# Patient Record
Sex: FEMALE | Race: WHITE | URBAN - METROPOLITAN AREA
[De-identification: names, ages, dates, MRNs, and addresses within clinical notes are randomized per-mention and may not be internally consistent; named-entity substitution may affect disease eponyms.]

---

## 2017-04-20 DIAGNOSIS — Z76.0 ENCOUNTER FOR MEDICATION REFILL: ICD-10-CM

## 2017-04-21 RX ORDER — HYDROCORTISONE AND ACETIC ACID 20.75; 10.375 MG/ML; MG/ML
SOLUTION AURICULAR (OTIC)
Qty: 10 ML | Refills: 0 | Status: SHIPPED | OUTPATIENT
Start: 2017-04-21 | End: 2018-01-08

## 2017-04-21 NOTE — TELEPHONE ENCOUNTER
Pending Prescriptions:                       Disp   Refills    acetic acid-hydrocortisone (VOSOL-HC) simone*10 mL  0            Sig: INSTILL 2 DROPS INTO THE RIGHT EAR TWICE DAILY    Last OV 9/20/16  TSH, CMP, CBC 9/20/16  BMP, Lipid 11/19/14

## 2017-05-11 ENCOUNTER — DOCUMENTATION ONLY (OUTPATIENT)
Dept: SLEEP MEDICINE | Facility: CLINIC | Age: 52
End: 2017-05-11

## 2017-05-11 NOTE — PROGRESS NOTES
6 Month [facility] visit    Message left for patient to return call    Objective measures: 14 day rolling measures     Device settings:    EPAP Min Auto CPAP: 9 (CPAP Min Auto CPAP/ASV)    EPAP Max Auto CPAP: 15 (CPAP Max Auto CPAP/ASV)    Avg  pressure (90th %ile) 14 day average (Cata): 11.9cm H20    Objective measures: 14 day rolling measures      Compliance   (Goal >70%)  % compliance greater than four hours rolling average 14 days: [id number] %      Leak   (Goal < 10%)  Average % of night in large leak Rolling Average 14 days (CATA): 0/100      AHI  (Goal < 5)  AHI Rolling Average 14 Day: 1.64      Usage  (Goal >240)  Time mask on face 14 day average: 415 min    Assessment:Pt meeting objective benchmarks. Patient meeting subjective benchmarks.  Action plan: Waiting for patient to return call.  pt to follow up per provider request (1-2 yrs)

## 2017-08-21 ENCOUNTER — HOSPITAL ENCOUNTER (OUTPATIENT)
Dept: MAMMOGRAPHY | Facility: CLINIC | Age: 52
Discharge: HOME | End: 2017-08-21
Attending: FAMILY MEDICINE | Admitting: FAMILY MEDICINE
Payer: COMMERCIAL

## 2017-08-21 DIAGNOSIS — Z12.31 VISIT FOR SCREENING MAMMOGRAM: ICD-10-CM

## 2017-08-21 PROCEDURE — G0202 SCR MAMMO BI INCL CAD: HCPCS

## 2017-09-11 ENCOUNTER — OFFICE VISIT (OUTPATIENT)
Dept: FAMILY MEDICINE | Facility: CLINIC | Age: 52
End: 2017-09-11

## 2017-09-11 VITALS
OXYGEN SATURATION: 98 % | TEMPERATURE: 98.5 F | BODY MASS INDEX: 43.02 KG/M2 | WEIGHT: 252 LBS | HEIGHT: 64 IN | HEART RATE: 71 BPM | DIASTOLIC BLOOD PRESSURE: 80 MMHG | SYSTOLIC BLOOD PRESSURE: 118 MMHG

## 2017-09-11 DIAGNOSIS — E66.01 MORBID OBESITY DUE TO EXCESS CALORIES (H): ICD-10-CM

## 2017-09-11 DIAGNOSIS — Z00.00 ROUTINE GENERAL MEDICAL EXAMINATION AT A HEALTH CARE FACILITY: Primary | ICD-10-CM

## 2017-09-11 PROCEDURE — 99396 PREV VISIT EST AGE 40-64: CPT | Performed by: FAMILY MEDICINE

## 2017-09-11 NOTE — MR AVS SNAPSHOT
After Visit Summary   9/11/2017    Faustina Love    MRN: [id number]           Patient Information     Date Of Birth          1965        Visit Information        Provider Department      9/11/2017 2:15 PM Steve Turner MD [facility]        Today's Diagnoses     Routine general medical examination at a health care facility    -  1    Morbid obesity due to excess calories (H)          Care Instructions      Preventive Health Recommendations  Female Ages 50 - 64    Yearly exam: See your health care provider every year in order to  o Review health changes.   o Discuss preventive care.    o Review your medicines if your doctor has prescribed any.      Get a Pap test every three years (unless you have an abnormal result and your provider advises testing more often).    If you get Pap tests with HPV test, you only need to test every 5 years, unless you have an abnormal result.     You do not need a Pap test if your uterus was removed (hysterectomy) and you have not had cancer.    You should be tested each year for STDs (sexually transmitted diseases) if you're at risk.     Have a mammogram every 1 to 2 years.    Have a colonoscopy at age 50, or have a yearly FIT test (stool test). These exams screen for colon cancer.      Have a cholesterol test every 5 years, or more often if advised.    Have a diabetes test (fasting glucose) every three years. If you are at risk for diabetes, you should have this test more often.     If you are at risk for osteoporosis (brittle bone disease), think about having a bone density scan (DEXA).    Shots: Get a flu shot each year. Get a tetanus shot every 10 years.    Nutrition:     Eat at least 5 servings of fruits and vegetables each day.    Eat whole-grain bread, whole-wheat pasta and brown rice instead of white grains and rice.    Talk to your provider about Calcium and Vitamin D.     Lifestyle    Exercise at least 150 minutes a week (30 minutes a  day, 5 days a week). This will help you control your weight and prevent disease.    Limit alcohol to one drink per day.    No smoking.     Wear sunscreen to prevent skin cancer.     See your dentist every six months for an exam and cleaning.    See your eye doctor every 1 to 2 years.            Follow-ups after your visit        Your next 10 appointments already scheduled     Nov 21, 2017 11:00 AM CST   Return Sleep Patient with Bennett Ezra Goltz, PA-C   [facility] ([facility])    [address] [id number] [phone]            Nov 28, 2017 10:30 AM CST   Return Sleep Patient with Bennett Ezra Goltz, PA-C   [facility] ([facility])    [address] [id number] [phone]              Who to contact     If you have questions or need follow up information about today's clinic visit or your schedule please contact [facility] directly at [phone].  Normal or non-critical lab and imaging results will be communicated to you by [employer], letter or phone within 4 business days after the clinic has received the results. If you do not hear from us within 7 days, please contact the clinic through [employer] or phone. If you have a critical or abnormal lab result, we will notify you by phone as soon as possible.  Submit refill requests through [employer] or call your pharmacy and they will forward the refill request to us. Please allow 3 business days for your refill to be completed.          Additional Information About Your Visit        [employer] Information     [employer] gives you secure access to your electronic health record. If you see a primary care provider, you can also send messages to your care team and make appointments. If you have questions, please call your primary care clinic.  If you do not have a primary care provider, please call [phone] and they will  "assist you.        Care EveryWhere ID     This is your Care EveryWhere ID. This could be used by other organizations to access your [place] medical records  [id number]        Your Vitals Were     Pulse Temperature Height Pulse Oximetry BMI (Body Mass Index)       71 98.5  F (36.9  C) 1.626 m (5' 4\") 98% 43.26 kg/m2        Blood Pressure from Last 3 Encounters:   09/11/17 118/80   12/13/16 113/69   10/04/16 134/85    Weight from Last 3 Encounters:   09/11/17 114.3 kg (252 lb)   12/13/16 117.5 kg (259 lb)   10/04/16 116.3 kg (256 lb 6.4 oz)              Today, you had the following     No orders found for display       Primary Care Provider Office Phone # Fax #    Steve Turner [phone] [address] [facility] [address]        Equal Access to Services     [facility]: Hadii aad ku hadasho Soomaali, waaxda luqadaha, qaybta kaalmada adeegyada, waxay idiin hayaan renéeeg medina novak . So [facility] [phone].    ATENCIÓN: Si habla español, tiene a francis disposición servicios gratuitos de asistencia lingüística. Llame al [phone].    We comply with applicable federal civil rights laws and [place] laws. We do not discriminate on the basis of race, color, national origin, age, disability sex, sexual orientation or gender identity.            Thank you!     Thank you for choosing [facility]  for your care. Our goal is always to provide you with excellent care. Hearing back from our patients is one way we can continue to improve our services. Please take a few minutes to complete the written survey that you may receive in the mail after your visit with us. Thank you!             Your Updated Medication List - Protect others around you: Learn how to safely use, store and throw away your medicines at www.disposemymeds.org.          This list is accurate as of: 9/11/17  7:15 PM.  Always use your most recent med list.                   Brand Name Dispense Instructions for " use Diagnosis    acetic acid-hydrocortisone otic solution    VOSOL-HC    10 mL    INSTILL 2 DROPS INTO THE RIGHT EAR TWICE DAILY    Encounter for medication refill       ACIDOPHILUS PO           ATIVAN PO      Take 0.5 mg by mouth as needed for anxiety        CELEXA PO      Take 10 mg by mouth daily        CITRACAL PETITES/VITAMIN D 200-250 MG-UNIT Tabs   Generic drug:  Calcium Citrate-Vitamin D      Take 1 tablet by mouth every morning.        FISH OIL CONCENTRATE PO      Take 1 capsule by mouth daily        hydrocortisone 1 % ointment      Apply 1 applicator topically 2 times daily        MIRENA (52 MG) IU           MULTIVITAMINS PO      Take 1 tablet by mouth daily.        order for DME      DREAMSTATION 5-15 CM/H20 PILLOW BRO FX FOR HER        vitamin B complex with vitamin C Tabs tablet      Take 1 tablet by mouth daily        vitamin D3 2000 UNITS Caps      Take 1 capsule by mouth daily.

## 2017-09-11 NOTE — PROGRESS NOTES
SUBJECTIVE:   CC: Faustina Love is an 52 year old woman who presents for preventive health visit.     Healthy Habits:    Do you get at least three servings of calcium containing foods daily (dairy, green leafy vegetables, etc.)? yes and no, taking calcium and/or vitamin D supplement    Amount of exercise or daily activities, outside of work: 5 day(s) per week    Problems taking medications regularly No    Medication side effects: No    Have you had an eye exam in the past two years? yes    Do you see a dentist twice per year? yes    Do you have sleep apnea, excessive snoring or daytime drowsiness?yes, sleep apnea.    Mammogram done on this date: 8/21/17 (approximately), [facility], results were Normal.   Pap smear done on this date: 2/16/17 (approximately), by this group: [facility], results were Normal.         CONCERNS  Refill or otic Rx, dry scalp, MD opinion on buiatrics surgery.     Today's PHQ-2 Score:   PHQ-2 ( 1999 [employer]) 9/11/2017 2/16/2016   Q1: Little interest or pleasure in doing things 0 0   Q2: Feeling down, depressed or hopeless 0 0   PHQ-2 Score 0 0         Abuse: Current or Past(Physical, Sexual or Emotional)- No  Do you feel safe in your environment - Yes  Social History   Substance Use Topics     Smoking status: Never Smoker     Smokeless tobacco: Never Used     Alcohol use 0.0 oz/week     0 Standard drinks or equivalent per week      Comment: once or twice a month     The patient does not drink >3 drinks per day nor >7 drinks per week.      Patient Active Problem List   Diagnosis     Glaucoma     Generalized anxiety disorder     Morbid obesity (H)     GLORY (obstructive sleep apnea)     Nocturnal hypoxemia     Past Surgical History:   Procedure Laterality Date     C OPEN RX ANKLE DISLOCATN+FIXATN  2008       Social History   Substance Use Topics     Smoking status: Never Smoker     Smokeless tobacco: Never Used     Alcohol use 0.0 oz/week     0 Standard drinks or equivalent  per week      Comment: once or twice a month     Family History   Problem Relation Age of Onset     GASTROINTESTINAL DISEASE Mother 59     polyps     Respiratory Father 79     pulmonary fibrosis     Family History Negative Sister      Family History Negative Sister      Sleep Apnea Sister          Current Outpatient Prescriptions   Medication Sig Dispense Refill     LORazepam (ATIVAN PO) Take 0.5 mg by mouth as needed for anxiety       acetic acid-hydrocortisone (VOSOL-HC) otic solution INSTILL 2 DROPS INTO THE RIGHT EAR TWICE DAILY 10 mL 0     order for DME DREAMSTATION  5-15 CM/H20  PILLOW BRO FX FOR HER       Lactobacillus (ACIDOPHILUS PO)        Levonorgestrel (MIRENA, 52 MG, IU)        hydrocortisone 1 % ointment Apply 1 applicator topically 2 times daily       Citalopram Hydrobromide (CELEXA PO) Take 10 mg by mouth daily        Omega-3 Fatty Acids (FISH OIL CONCENTRATE PO) Take 1 capsule by mouth daily        vitamin  B complex with vitamin C (VITAMIN  B COMPLEX) TABS Take 1 tablet by mouth daily       Multiple Vitamin (MULTIVITAMINS PO) Take 1 tablet by mouth daily.       Calcium Citrate-Vitamin D (CITRACAL PETITES/VITAMIN D) 200-250 MG-UNIT TABS Take 1 tablet by mouth every morning.       Cholecalciferol (VITAMIN D3) 2000 UNIT CAPS Take 1 capsule by mouth daily.           Patient over age 50, mutual decision to screen reflected in health maintenance.      Pertinent mammograms are reviewed under the imaging tab.  History of abnormal Pap smear: NO - age 30- 65 PAP every 3 years recommended     Reviewed and updated as needed this visit by clinical staffTobacco  Allergies  Meds         Reviewed and updated as needed this visit by Provider            ROS:  C: NEGATIVE for fever, chills, change in weight  I: NEGATIVE for worrisome rashes, moles or lesions  E: NEGATIVE for vision changes or irritation  ENT: NEGATIVE for ear, mouth and throat problems  R: NEGATIVE for significant cough or SOB  B: NEGATIVE for  "masses, tenderness or discharge  CV: NEGATIVE for chest pain, palpitations or peripheral edema  GI: NEGATIVE for nausea, abdominal pain, heartburn, or change in bowel habits  : NEGATIVE for unusual urinary or vaginal symptoms. Periods are regular.  M: NEGATIVE for significant arthralgias or myalgia  N: NEGATIVE for weakness, dizziness or paresthesias  P: NEGATIVE for changes in mood or affect    OBJECTIVE:   /80  Pulse 71  Temp 98.5  F (36.9  C)  Ht 1.626 m (5' 4\")  Wt 114.3 kg (252 lb)  SpO2 98%  BMI 43.26 kg/m2  EXAM:  GENERAL:morbidly obese, alert and no distress  EYES: Eyes grossly normal to inspection, PERRL and conjunctivae and sclerae normal  HENT: ear canals and TM's normal, nose and mouth without ulcers or lesions  NECK: no adenopathy, no asymmetry, masses, or scars and thyroid normal to palpation  RESP: lungs clear to auscultation - no rales, rhonchi or wheezes  BREAST: done at GYN  CV: regular rate and rhythm, normal S1 S2, no S3 or S4, no murmur, click or rub, no peripheral edema and peripheral pulses strong  ABDOMEN: soft, nontender, no hepatosplenomegaly, no masses and bowel sounds normal  MS: no gross musculoskeletal defects noted, no edema  SKIN: no suspicious lesions or rashes  NEURO: Normal strength and tone, mentation intact and speech normal  PSYCH: mentation appears normal, affect normal/bright    ASSESSMENT/PLAN:       ICD-10-CM    1. Routine general medical examination at a health care facility Z00.00    2. Morbid obesity due to excess calories (H) E66.01    we discussed bariatric surgery. I advised her not to do this, as she has the knowledge and the track record of losing weight. Advised that her goal of 150# should be met over 3-5 years.    COUNSELING:   Reviewed preventive health counseling, as reflected in patient instructions       Regular exercise       Healthy diet/nutrition         reports that she has never smoked. She has never used smokeless tobacco.    Estimated body " "mass index is 43.26 kg/(m^2) as calculated from the following:    Height as of this encounter: 1.626 m (5' 4\").    Weight as of this encounter: 114.3 kg (252 lb).   Weight management plan: Discussed healthy diet and exercise guidelines and patient will follow up in 12 months in clinic to re-evaluate.    Counseling Resources:  ATP IV Guidelines  Pooled Cohorts Equation Calculator  Breast Cancer Risk Calculator  FRAX Risk Assessment  ICSI Preventive Guidelines  Dietary Guidelines for Americans, 2010  USDA's MyPlate  ASA Prophylaxis  Lung CA Screening    Steve Turner MD  [facility]  "

## 2017-11-21 ENCOUNTER — OFFICE VISIT (OUTPATIENT)
Dept: SLEEP MEDICINE | Facility: CLINIC | Age: 52
End: 2017-11-21
Payer: COMMERCIAL

## 2017-11-21 VITALS
HEIGHT: 64 IN | BODY MASS INDEX: 45.41 KG/M2 | RESPIRATION RATE: 18 BRPM | HEART RATE: 77 BPM | WEIGHT: 266 LBS | DIASTOLIC BLOOD PRESSURE: 82 MMHG | SYSTOLIC BLOOD PRESSURE: 133 MMHG | OXYGEN SATURATION: 94 %

## 2017-11-21 DIAGNOSIS — G47.33 OSA (OBSTRUCTIVE SLEEP APNEA): Primary | ICD-10-CM

## 2017-11-21 PROCEDURE — 99214 OFFICE O/P EST MOD 30 MIN: CPT | Performed by: PHYSICIAN ASSISTANT

## 2017-11-21 NOTE — PATIENT INSTRUCTIONS

## 2017-11-21 NOTE — PROGRESS NOTES
Sleep Study Follow-Up Visit:    Date on this visit: 11/21/2017    Faustina Love comes in today for follow-up of her CPAP use for severe GLORY and hypoxemia. She was initially seen at the [facility] for sleeping 16 hours on weekends for a couple of years. Her medical history is significant for JUNI.     Bed Time Starts: 11:00 PM.  Bed Time Ends: 8:59 AM.  Total estimated sleep time 599.7 minutes.     AHI: 31.1/hr    HOLGER: 34.1/hr   Supine AHI: 37.2/hr  Lateral AHI: 23.7/hr on left and 32.2/hr on right           Average SpO2: 88%  Lowest Desaturation: 51%. Time Spent Below 89%: 271 minutes     She is on auto CPAP 9-15 cm. She has no particular concerns, but needs new supplies. She does wash the supplies regularly. She continues to feel CPAP is life changing. She has been exercising regularly and has weaned herself off of citalopram. She uses nasal pillows mask. She is not aware of snoring with CPAP. She denies mask leak unless the hose gets pulled down. She was initially getting some irritation in her nostril, but that went away. She is comfortable with the pressure. She denies dry mouth. She did not use the humidifier in the summer.     The compliance data shows that she has used the CPAP for 30/30 nights, 100% of nights for >4 hours.  The 90th% pressure is 15 cm.  The average time in large leak is 44 sec.  The average nightly usage is 7:36.  The average AHI is 1.4/hr. Her snore index is 44 and the pressure is elevated due to that.     Her weight is 266 today, 259 last year.      Past medical/surgical history, family history, social history, medications and allergies were reviewed.      Problem List:  Patient Active Problem List    Diagnosis Date Noted     Nocturnal hypoxemia 10/17/2016     Priority: Medium     GLORY (obstructive sleep apnea) 10/12/2016     Priority: Medium     Severe GLORY       Morbid obesity (H) 10/31/2012     Priority: Medium     Glaucoma 06/11/2012     Priority: Medium      Generalized anxiety disorder      Priority: Medium     Diagnosis updated by automated process. Provider to review and confirm.          Impression/Plan:     (G47.33) GLORY (obstructive sleep apnea)  (primary encounter diagnosis)  Comment: She is doing very well with CPAP. The pressure is often at the upper limit because of snoring. Her filter is very dirty and needs to be replaced. The hose has a broken clip where it attaches to the machine.  Plan: Comprehensive DME        Changed pressures 10-16 cm. A prescription was written for new supplies.      She will follow up with me in about 1-2 year(s).     Twenty-five minutes spent with patient, all of which were spent face-to-face counseling, consulting, coordinating plan of care.      Bennett Goltz, PA-C    CC: Steve Turner MD

## 2017-11-21 NOTE — MR AVS SNAPSHOT
After Visit Summary   11/21/2017    Faustina Love    MRN: [id number]           Patient Information     Date Of Birth          1965        Visit Information        Provider Department      11/21/2017 11:00 AM Goltz, Bennett Ezra, PA-C [facility] [place]        Today's Diagnoses     GLORY (obstructive sleep apnea)    -  1      Care Instructions      Your BMI is Body mass index is 45.64 kg/(m^2).  Weight management is a personal decision.  If you are interested in exploring weight loss strategies, the following discussion covers the approaches that may be successful. Body mass index (BMI) is one way to tell whether you are at a healthy weight, overweight, or obese. It measures your weight in relation to your height.  A BMI of 18.5 to 24.9 is in the healthy range. A person with a BMI of 25 to 29.9 is considered overweight, and someone with a BMI of 30 or greater is considered obese. More than two-thirds of American adults are considered overweight or obese.  Being overweight or obese increases the risk for further weight gain. Excess weight may lead to heart disease and diabetes.  Creating and following plans for healthy eating and physical activity may help you improve your health.  Weight control is part of healthy lifestyle and includes exercise, emotional health, and healthy eating habits. Careful eating habits lifelong are the mainstay of weight control. Though there are significant health benefits from weight loss, long-term weight loss with diet alone may be very difficult to achieve- studies show long-term success with dietary management in less than 10% of people. Attaining a healthy weight may be especially difficult to achieve in those with severe obesity. In some cases, medications, devices and surgical management might be considered.  What can you do?  If you are overweight or obese and are interested in methods for weight loss, you should discuss this with your provider.      Consider reducing daily calorie intake by 500 calories.     Keep a food journal.     Avoiding skipping meals, consider cutting portions instead.    Diet combined with exercise helps maintain muscle while optimizing fat loss. Strength training is particularly important for building and maintaining muscle mass. Exercise helps reduce stress, increase energy, and improves fitness. Increasing exercise without diet control, however, may not burn enough calories to loose weight.       Start walking three days a week 10-20 minutes at a time    Work towards walking thirty minutes five days a week     Eventually, increase the speed of your walking for 1-2 minutes at time    In addition, we recommend that you review healthy lifestyles and methods for weight loss available through the National Institutes of Health patient information sites:  http://win.niddk.nih.gov/publications/index.htm    And look into health and wellness programs that may be available through your health insurance provider, employer, local community center, or dash club.    Weight management plan: Patient was referred to their PCP to discuss a diet and exercise plan.              Follow-ups after your visit        Follow-up notes from your care team     Return in about 2 years (around 11/21/2019) for CPAP compliance recheck.      Who to contact     If you have questions or need follow up information about today's clinic visit or your schedule please contact [facility] directly at [phone].  Normal or non-critical lab and imaging results will be communicated to you by MyChart, letter or phone within 4 business days after the clinic has received the results. If you do not hear from us within 7 days, please contact the clinic through [employer] or phone. If you have a critical or abnormal lab result, we will notify you by phone as soon as possible.  Submit refill requests through [employer] or call your pharmacy and they will forward the  "refill request to us. Please allow 3 business days for your refill to be completed.          Additional Information About Your Visit        Incentivehart Information     [employer] gives you secure access to your electronic health record. If you see a primary care provider, you can also send messages to your care team and make appointments. If you have questions, please call your primary care clinic.  If you do not have a primary care provider, please call [phone] and they will assist you.        Care EveryWhere ID     This is your Care EveryWhere ID. This could be used by other organizations to access your [facility] medical records  [id number]        Your Vitals Were     Pulse Respirations Height Pulse Oximetry BMI (Body Mass Index)       77 18 1.626 m (5' 4.02\") 94% 45.64 kg/m2        Blood Pressure from Last 3 Encounters:   11/21/17 133/82   09/11/17 118/80   12/13/16 113/69    Weight from Last 3 Encounters:   11/21/17 120.7 kg (266 lb)   09/11/17 114.3 kg (252 lb)   12/13/16 117.5 kg (259 lb)              We Performed the Following     Comprehensive DME        Primary Care Provider Office Phone # Fax #    Steve Turner [phone] [address] [facility] [address]        Equal Access to Services     MARYBETH ZAMARRIPA : Hadii aad ku hadasho Soomaali, waaxda luqadaha, qaybta kaalmada adeegyada, waxay idiin hayaan eduardo khlexy novak . So [facility] [phone].    ATENCIÓN: Si habla español, tiene a francis disposición servicios gratuitos de asistencia lingüística. Ct al [phone].    We comply with applicable federal civil rights laws and [place] laws. We do not discriminate on the basis of race, color, national origin, age, disability, sex, sexual orientation, or gender identity.            Thank you!     Thank you for choosing [facility]  for your care. Our goal is always to provide you with excellent care. Hearing back from our patients is one way we can continue to " improve our services. Please take a few minutes to complete the written survey that you may receive in the mail after your visit with us. Thank you!             Your Updated Medication List - Protect others around you: Learn how to safely use, store and throw away your medicines at www.disposemymeds.org.          This list is accurate as of: 11/21/17 11:43 AM.  Always use your most recent med list.                   Brand Name Dispense Instructions for use Diagnosis    acetic acid-hydrocortisone otic solution    VOSOL-HC    10 mL    INSTILL 2 DROPS INTO THE RIGHT EAR TWICE DAILY    Encounter for medication refill       ACIDOPHILUS PO           ATIVAN PO      Take 0.5 mg by mouth as needed for anxiety        CELEXA PO      Take 10 mg by mouth daily        CITRACAL PETITES/VITAMIN D 200-250 MG-UNIT Tabs   Generic drug:  Calcium Citrate-Vitamin D      Take 1 tablet by mouth every morning.        FISH OIL CONCENTRATE PO      Take 1 capsule by mouth daily        hydrocortisone 1 % ointment      Apply 1 applicator topically 2 times daily        MIRENA (52 MG) IU           MULTIVITAMINS PO      Take 1 tablet by mouth daily.        order for DME      DREAMSTATION 5-15 CM/H20 PILLJUSTIN BRO FX FOR HER        vitamin B complex with vitamin C Tabs tablet      Take 1 tablet by mouth daily        vitamin D3 2000 UNITS Caps      Take 1 capsule by mouth daily.

## 2017-11-21 NOTE — NURSING NOTE
"Chief Complaint   Patient presents with     Sleep Problem       Initial /82  Pulse 77  Resp 18  Ht 1.626 m (5' 4.02\")  Wt 120.7 kg (266 lb)  SpO2 94%  BMI 45.64 kg/m2 Estimated body mass index is 45.64 kg/(m^2) as calculated from the following:    Height as of this encounter: 1.626 m (5' 4.02\").    Weight as of this encounter: 120.7 kg (266 lb).  Medication Reconciliation: complete   ESS 1  Faiza Richards MA        "

## 2018-01-08 DIAGNOSIS — H60.339: ICD-10-CM

## 2018-01-08 DIAGNOSIS — Z76.0 ENCOUNTER FOR MEDICATION REFILL: Primary | ICD-10-CM

## 2018-01-08 RX ORDER — HYDROCORTISONE AND ACETIC ACID 20.75; 10.375 MG/ML; MG/ML
SOLUTION AURICULAR (OTIC)
Qty: 10 ML | Refills: 0 | Status: SHIPPED | OUTPATIENT
Start: 2018-01-08 | End: 2018-01-10 | Stop reason: ALTCHOICE

## 2018-01-08 NOTE — TELEPHONE ENCOUNTER
Patient calls requesting refill on: acetic acid-hydrocortisone (VOSOL-HC) otic solution.   Swims often and susceptible to swimmers ear which resolves nicely with these gtts. 1 bottle lasts 6+ months.   Last visit was cpx with Dr. Turner 9/2017. Drops last ordered 4/2017.   Plan: queued up refill and sent to Dr. Turner for review.   Yue Carney RN

## 2018-01-10 RX ORDER — NEOMYCIN SULFATE, POLYMYXIN B SULFATE AND HYDROCORTISONE 10; 3.5; 1 MG/ML; MG/ML; [USP'U]/ML
4 SUSPENSION/ DROPS AURICULAR (OTIC) 4 TIMES DAILY
Qty: 10 ML | Refills: 0 | Status: SHIPPED | OUTPATIENT
Start: 2018-01-10

## 2018-01-24 ENCOUNTER — TELEPHONE (OUTPATIENT)
Dept: FAMILY MEDICINE | Facility: CLINIC | Age: 53
End: 2018-01-24

## 2018-01-24 DIAGNOSIS — H60.339: Primary | ICD-10-CM

## 2018-01-24 RX ORDER — HYDROCORTISONE AND ACETIC ACID 20.75; 10.375 MG/ML; MG/ML
2 SOLUTION AURICULAR (OTIC) 2 TIMES DAILY
Qty: 10 ML | Refills: 0 | Status: SHIPPED | OUTPATIENT
Start: 2018-01-24

## 2018-01-24 NOTE — TELEPHONE ENCOUNTER
Patient calls with update.  She reports allergic reaction many years ago to cortisporin ear gtts.  She is requesting the original Vo-sol prescription be resent to pharmacy and PA be done.  Shazia Diaz

## 2019-01-03 DIAGNOSIS — G47.33 OSA (OBSTRUCTIVE SLEEP APNEA): Primary | ICD-10-CM

## 2019-05-03 ENCOUNTER — HEALTH MAINTENANCE LETTER (OUTPATIENT)
Age: 54
End: 2019-05-03

## 2019-10-02 ENCOUNTER — HEALTH MAINTENANCE LETTER (OUTPATIENT)
Age: 54
End: 2019-10-02

## 2019-10-30 ENCOUNTER — HEALTH MAINTENANCE LETTER (OUTPATIENT)
Age: 54
End: 2019-10-30

## 2020-03-22 ENCOUNTER — HEALTH MAINTENANCE LETTER (OUTPATIENT)
Age: 55
End: 2020-03-22

## 2020-06-17 ENCOUNTER — HOSPITAL ENCOUNTER (OUTPATIENT)
Facility: CLINIC | Age: 55
End: 2020-06-17
Attending: COLON & RECTAL SURGERY | Admitting: COLON & RECTAL SURGERY
Payer: COMMERCIAL

## 2020-06-17 DIAGNOSIS — Z11.59 ENCOUNTER FOR SCREENING FOR OTHER VIRAL DISEASES: Primary | ICD-10-CM

## 2020-09-06 DIAGNOSIS — Z11.59 ENCOUNTER FOR SCREENING FOR OTHER VIRAL DISEASES: ICD-10-CM

## 2020-09-06 PROCEDURE — U0003 INFECTIOUS AGENT DETECTION BY NUCLEIC ACID (DNA OR RNA); SEVERE ACUTE RESPIRATORY SYNDROME CORONAVIRUS 2 (SARS-COV-2) (CORONAVIRUS DISEASE [COVID-19]), AMPLIFIED PROBE TECHNIQUE, MAKING USE OF HIGH THROUGHPUT TECHNOLOGIES AS DESCRIBED BY CMS-2020-01-R: HCPCS | Performed by: COLON & RECTAL SURGERY

## 2020-09-07 LAB
SARS-COV-2 RNA SPEC QL NAA+PROBE: ABNORMAL
SPECIMEN SOURCE: ABNORMAL

## 2020-09-08 ENCOUNTER — TELEPHONE (OUTPATIENT)
Dept: EMERGENCY MEDICINE | Facility: CLINIC | Age: 55
End: 2020-09-08

## 2020-09-08 VITALS — WEIGHT: 194 LBS | HEIGHT: 64 IN | BODY MASS INDEX: 33.12 KG/M2

## 2020-09-08 RX ORDER — TRAZODONE HYDROCHLORIDE 50 MG/1
50 TABLET, FILM COATED ORAL AT BEDTIME
COMMUNITY

## 2020-09-08 RX ORDER — TOPIRAMATE 25 MG/1
25 TABLET, FILM COATED ORAL 2 TIMES DAILY
COMMUNITY

## 2020-09-08 ASSESSMENT — MIFFLIN-ST. JEOR: SCORE: 1464.98

## 2020-09-08 NOTE — TELEPHONE ENCOUNTER
"Coronavirus (COVID-19) Notification    Caller Name (Patient, parent, daughter/son, grandparent, etc)  Patient     Reason for call  Notify of Positive Coronavirus (COVID-19) lab results, assess symptoms,  review [facility] recommendations    Lab Result    Lab test:  2019-nCoV rRt-PCR or SARS-CoV-2 PCR    Oropharyngeal AND/OR nasopharyngeal swabs is POSITIVE for 2019-nCoV RNA/SARS-COV-2 PCR (COVID-19 virus)    RN Recommendations/Instructions per [facility] Coronavirus COVID-19 recommendations    Brief introduction script  Introduce self then review script:  \"I am calling on behalf of [employer].  We were notified that your Coronavirus test (COVID-19) for was POSITIVE for the virus.  I have some information to relay to you but first I wanted to mention that the [place] Dept of Health will be contacting you shortly [it's possible MD already called Patient] to talk to you more about how you are feeling and other people you have had contact with who might now also have the virus.  Also, [facility] is Partnering with the [facility] for Covid-19 research, you may be contacted directly by research staff.\"    Assessment (Inquire about Patient's current symptoms)   Assessment   Current Symptoms at time of phone call: (if no symptoms, document No symptoms]  None    Symptoms onset (if applicable)  N/A     If at time of call, Patients symptoms hare worsened, the Patient should contact 911 or have someone drive them to Emergency Dept promptly:      If Patient calling 911, inform 911 personal that you have tested positive for the Coronavirus (COVID-19).  Place mask on and await 911 to arrive.    If Emergency Dept, If possible, please have another adult drive you to the Emergency Dept but you need to wear mask when in contact with other people.      Review information with Patient    Your result was positive. This means you have COVID-19 (coronavirus).  We have sent you a letter that reviews the " information that I'll be reviewing with you now.    How can I protect others?    If you have symptoms: stay home and away from others (self-isolate) until:    You've had no fever--and no medicine that reduces fever--for 1 full day (24 hours). And       Your other symptoms have gotten better. For example, your cough or breathing has improved. And     At least 10 days have passed since your symptoms started. (If you've been told by a doctor that you have a weak immune system, wait 20 days.)     If you don't have symptoms: Stay home and away from others (self-isolate) until at least 10 days have passed since your first positive COVID-19 test. (Date test collected)    During this time:    Stay in your own room, including for meals. Use your own bathroom if you can.    Stay away from others in your home. No hugging, kissing or shaking hands. No visitors.     Don't go to work, school or anywhere else.     Clean  high touch  surfaces often (doorknobs, counters, handles, etc.). Use a household cleaning spray or wipes. You'll find a full list on the EPA website at www.epa.gov/pesticide-registration/list-n-disinfectants-use-against-sars-cov-2.     Cover your mouth and nose with a mask, tissue or other face covering to avoid spreading germs.    Wash your hands and face often with soap and water.    Caregivers in these groups are at risk for severe illness due to COVID-19:  o People 65 years and older  o People who live in a nursing home or long-term care facility  o People with chronic disease (lung, heart, cancer, diabetes, kidney, liver, immunologic)  o People who have a weakened immune system, including those who:  - Are in cancer treatment  - Take medicine that weakens the immune system, such as corticosteroids  - Had a bone marrow or organ transplant  - Have an immune deficiency  - Have poorly controlled HIV or AIDS  - Are obese (body mass index of 40 or higher)  - Smoke regularly    Caregivers should wear gloves while  washing dishes, handling laundry and cleaning bedrooms and bathrooms.    Wash and dry laundry with special caution. Don't shake dirty laundry, and use the warmest water setting you can.    If you have a weakened immune system, ask your doctor about other actions you should take.    For more tips, go to www.cdc.gov/coronavirus/2019-ncov/downloads/10Things.pdf.    You should not go back to work until you meet the guidelines above for ending your home isolation. You should meet these along with any other guidelines that your employer has.    Employers: This document serves as formal notice of your employee's medical guidelines for going back to work. They must meet the above guidelines before going back to work in person.    How can I take care of myself?    1. Get lots of rest. Drink extra fluids (unless a doctor has told you not to).    2. Take Tylenol (acetaminophen) for fever or pain. If you have liver or kidney problems, ask your family doctor if it's okay to take Tylenol.     Take either:     650 mg (two 325 mg pills) every 4 to 6 hours, or     1,000 mg (two 500 mg pills) every 8 hours as needed.     Note: Don't take more than 3,000 mg in one day. Acetaminophen is found in many medicines (both prescribed and over-the-counter medicines). Read all labels to be sure you don't take too much.    For children, check the Tylenol bottle for the right dose (based on their age or weight).    3. If you have other health problems (like cancer, heart failure, an organ transplant or severe kidney disease): Call your specialty clinic if you don't feel better in the next 2 days.    4. Know when to call 911: Emergency warning signs include:    Trouble breathing or shortness of breath    Pain or pressure in the chest that doesn't go away    Feeling confused like you haven't felt before, or not being able to wake up    Bluish-colored lips or face    5. Sign up for GetWell Loop. We know it's scary to hear that you have COVID-19. We  want to track your symptoms to make sure you're okay over the next 2 weeks. Please look for an email from [employer]--this is a free, online program that we'll use to keep in touch. To sign up, follow the link in the email. Learn more at [email or web address].    Where can I get more information?    [facility]: [email or web address]    Coronavirus Basics: www.health.[facility].[place]./diseases/coronavirus/basics.html    What to Do If You're Sick: www.cdc.gov/coronavirus/2019-ncov/about/steps-when-sick.html    Ending Home Isolation: www.cdc.gov/coronavirus/2019-ncov/hcp/disposition-in-home-patients.html     Caring for Someone with COVID-19: www.cdc.gov/coronavirus/2019-ncov/if-you-are-sick/care-for-someone.html     [facility] clinical trials (COVID-19 research studies): clinicalaffairs.[facility]-clinical-trials     A Positive COVID-19 letter will be sent via [employer] or the mail. (Exception, no letters sent to Presurgerical/Preprocedure Patients)    [Name]  Patsy Modi RN  [facility]  COVID19 Results Team RN  # [phone]

## 2020-09-08 NOTE — PROGRESS NOTES
"Faustina Love is a 55 year old female who is being evaluated via a billable telephone visit.      The patient has been notified of following:     \"This telephone visit will be conducted via a call between you and your physician/provider. We have found that certain health care needs can be provided without the need for a physical exam.  This service lets us provide the care you need with a short phone conversation.  If a prescription is necessary we can send it directly to your pharmacy.  If lab work is needed we can place an order for that and you can then stop by our lab to have the test done at a later time.    Telephone visits are billed at different rates depending on your insurance coverage. During this emergency period, for some insurers they may be billed the same as an in-person visit.  Please reach out to your insurance provider with any questions.    If during the course of the call the physician/provider feels a telephone visit is not appropriate, you will not be charged for this service.\"    Patient has given verbal consent for Telephone visit?  Yes    What phone number would you like to be contacted at? [phone]    [facility] you [facility]? [facility]    CPAP Follow-Up Visit:    Date on this visit: 9/9/2020    Faustina Love has a follow-up of her CPAP use for severe GLORY and hypoxemia. She was initially seen at the [facility] for sleeping 16 hours on weekends for a couple of years. Her medical history is significant for JUNI. She had sleeve gastrectomy in 4/2018.        HST 10/10/2016: (wt. 256#)  AHI: 31.1/hr    HOLGER: 34.1/hr   Supine AHI: 37.2/hr  Lateral AHI: 23.7/hr on left and 32.2/hr on right           Average SpO2: 88%  Lowest Desaturation: 51%. Time Spent Below 89%: 271 minutes    She was last seen in 11/2017. Her weight was 266 pounds. Her weight about a month ago was 194 pounds.     She has not used her CPAP since April. She stopped because her dog ate the " tubing and her prescription for supplies was . She was not feeling tired without it, but she had started phentermine, which may have counteracted any sleepiness. She was having anxiety, which she did not realize was from the phentermine, and switched her antidepressant. She cut the dose of phentermine. It was also causing some insomnia, trouble falling asleep before 1-2 AM and only getting 5-6 hours of sleep per night. She had been put on trazodone 50 mg for middle of the night awakenings related to jet lag after coming back from [place]. In August, she discontinued the phentermine all together. She has stayed on trazodone, but by accident, did not take it last night. She again woke around 3 AM.     Her  says she does not snore.     She just tested positive for COVID, screened prior to colonoscopy. She has been asymptomatic.       PAP machine: RespirDatahero. Pressure settings: 10-16 cm    The compliance data shows that from 3/9-20, the patient used the CPAP for 19/30 nights, 60% of nights for >4 hours.  The 90th% pressure is 11.5 cm.  The average time in large leak is 6 sec.  The average nightly usage is 6:35.  The average AHI is 1.3/hr.       Interface:  Mask: nasal pillows mask    Bedtime: 11 PM. She will read until midnight sometimes. Sometimes she does not get into bed until midnight and still reads for 15 minutes. She likes the peacefulness of that time and the opportunity to read. Her  is a night owl.  Wake time: 7:30 AM  She generally does not nap, but did this weekend, but is normally too anxious to nap. This weekend, she decided to try to not be so hard-working all of the time.    She has not been exercising as much since the pandemic.      Past medical/surgical history, family history, social history, medications and allergies were reviewed.      Problem List:  Patient Active Problem List    Diagnosis Date Noted     Nocturnal hypoxemia 10/17/2016     Priority: Medium     GLORY  (obstructive sleep apnea) 10/12/2016     Priority: Medium     Severe GLORY       Morbid obesity (H) 10/31/2012     Priority: Medium     Glaucoma 06/11/2012     Priority: Medium     Generalized anxiety disorder      Priority: Medium     Diagnosis updated by automated process. Provider to review and confirm.          Impression/Plan:    (G47.33) GLORY (obstructive sleep apnea)  (primary encounter diagnosis)  Comment: She has lost 70+ pounds with gastric sleeve procedure. She stopped CPAP in April because her dog chewed her hose. Her  has not observed snoring. She denies sleepiness, but there have been some factors confounding noticing sleepiness (anxiety, phentermine).   Plan: HST-Home Sleep Apnea Test        We will repeat a sleep study to see if she has any significant apnea.    (G47.00) Insomnia, unspecified type  Comment: She has had trouble with sleep since coming back from [place]. This was exacerbated by being on phentermine (now D/C'd), having anxiety with the pandemic as well as concerns about ovarian cancer (which turned out to be a cyst). She has been on trazodone 50 mg, which has helped her sleep, but she would like to not need it. She went without trazodone last night and still woke at 3 AM. She has not been as physically active as she had been prior to the pandemic.  Plan: She was advised to only be in bed between 12-7:30 AM. Avoid naps and sleeping in. Try cutting the trazodone in half and weaning off slowly. Increase exercise.     She will follow up with me in about 2 week(s) after her test.     37 minutes (10:30 AM-11:07 AM) spent with patient, all of which were spent face-to-face counseling, consulting, coordinating plan of care.      Bennett Goltz, PA-C    CC: No ref. provider found

## 2020-09-09 ENCOUNTER — VIRTUAL VISIT (OUTPATIENT)
Dept: SLEEP MEDICINE | Facility: CLINIC | Age: 55
End: 2020-09-09
Payer: COMMERCIAL

## 2020-09-09 DIAGNOSIS — G47.00 INSOMNIA, UNSPECIFIED TYPE: ICD-10-CM

## 2020-09-09 DIAGNOSIS — G47.33 OSA (OBSTRUCTIVE SLEEP APNEA): Primary | ICD-10-CM

## 2020-09-09 PROCEDURE — 99214 OFFICE O/P EST MOD 30 MIN: CPT | Mod: TEL | Performed by: PHYSICIAN ASSISTANT

## 2020-09-17 NOTE — NURSING NOTE
Called pt and she states that at this time she is unable to go to [facility] for HST. She rather wait till [place] location opens back.    Sarath Frankel  Sleep Clinic Specialist - [place]

## 2021-01-15 ENCOUNTER — HEALTH MAINTENANCE LETTER (OUTPATIENT)
Age: 56
End: 2021-01-15

## 2021-09-04 ENCOUNTER — HEALTH MAINTENANCE LETTER (OUTPATIENT)
Age: 56
End: 2021-09-04

## 2022-02-19 ENCOUNTER — HEALTH MAINTENANCE LETTER (OUTPATIENT)
Age: 57
End: 2022-02-19

## 2022-10-22 ENCOUNTER — HEALTH MAINTENANCE LETTER (OUTPATIENT)
Age: 57
End: 2022-10-22

## 2023-04-01 ENCOUNTER — HEALTH MAINTENANCE LETTER (OUTPATIENT)
Age: 58
End: 2023-04-01